# Patient Record
Sex: MALE | Race: WHITE | NOT HISPANIC OR LATINO | URBAN - NONMETROPOLITAN AREA
[De-identification: names, ages, dates, MRNs, and addresses within clinical notes are randomized per-mention and may not be internally consistent; named-entity substitution may affect disease eponyms.]

---

## 2017-10-09 ENCOUNTER — FOLLOW UP ESTABLISHED (OUTPATIENT)
Dept: URBAN - NONMETROPOLITAN AREA CLINIC 6 | Facility: CLINIC | Age: 62
End: 2017-10-09
Payer: COMMERCIAL

## 2017-10-09 DIAGNOSIS — H25.13 AGE-RELATED NUCLEAR CATARACT, BILATERAL: ICD-10-CM

## 2017-10-09 DIAGNOSIS — H52.4 PRESBYOPIA: ICD-10-CM

## 2017-10-09 PROCEDURE — 92014 COMPRE OPH EXAM EST PT 1/>: CPT | Performed by: OPTOMETRIST

## 2017-10-09 PROCEDURE — 92015 DETERMINE REFRACTIVE STATE: CPT | Performed by: OPTOMETRIST

## 2017-10-09 ASSESSMENT — VISUAL ACUITY
OS: 20/20
OD: 20/20

## 2017-10-09 ASSESSMENT — INTRAOCULAR PRESSURE
OD: 14
OS: 15

## 2018-04-16 ENCOUNTER — FOLLOW UP ESTABLISHED (OUTPATIENT)
Dept: URBAN - NONMETROPOLITAN AREA CLINIC 6 | Facility: CLINIC | Age: 63
End: 2018-04-16

## 2018-04-16 PROCEDURE — 92015 DETERMINE REFRACTIVE STATE: CPT | Performed by: OPTOMETRIST

## 2018-04-16 ASSESSMENT — VISUAL ACUITY
OS: 20/20
OD: 20/20

## 2018-04-16 ASSESSMENT — INTRAOCULAR PRESSURE
OD: 16
OS: 16

## 2019-08-12 ENCOUNTER — FOLLOW UP ESTABLISHED (OUTPATIENT)
Dept: URBAN - NONMETROPOLITAN AREA CLINIC 6 | Facility: CLINIC | Age: 64
End: 2019-08-12
Payer: COMMERCIAL

## 2019-08-12 DIAGNOSIS — H52.03 HYPERMETROPIA, BILATERAL: Primary | ICD-10-CM

## 2019-08-12 PROCEDURE — 92014 COMPRE OPH EXAM EST PT 1/>: CPT | Performed by: OPTOMETRIST

## 2019-08-12 PROCEDURE — 92015 DETERMINE REFRACTIVE STATE: CPT | Performed by: OPTOMETRIST

## 2019-08-12 ASSESSMENT — INTRAOCULAR PRESSURE
OS: 17
OD: 16

## 2019-08-12 ASSESSMENT — VISUAL ACUITY: OD: 20/20

## 2021-05-14 ENCOUNTER — OFFICE VISIT (OUTPATIENT)
Dept: URBAN - NONMETROPOLITAN AREA CLINIC 6 | Facility: CLINIC | Age: 66
End: 2021-05-14
Payer: COMMERCIAL

## 2021-05-14 PROCEDURE — 92014 COMPRE OPH EXAM EST PT 1/>: CPT | Performed by: OPTOMETRIST

## 2021-05-14 ASSESSMENT — INTRAOCULAR PRESSURE
OD: 16
OS: 16

## 2021-05-14 ASSESSMENT — VISUAL ACUITY
OD: 20/20
OS: 20/20

## 2022-05-23 ENCOUNTER — OFFICE VISIT (OUTPATIENT)
Dept: URBAN - NONMETROPOLITAN AREA CLINIC 6 | Facility: CLINIC | Age: 67
End: 2022-05-23
Payer: COMMERCIAL

## 2022-05-23 DIAGNOSIS — H25.13 AGE-RELATED NUCLEAR CATARACT, BILATERAL: ICD-10-CM

## 2022-05-23 DIAGNOSIS — H52.4 PRESBYOPIA: Primary | ICD-10-CM

## 2022-05-23 PROCEDURE — 92014 COMPRE OPH EXAM EST PT 1/>: CPT | Performed by: OPTOMETRIST

## 2022-05-23 ASSESSMENT — VISUAL ACUITY
OD: 20/20
OS: 20/20

## 2022-05-23 ASSESSMENT — INTRAOCULAR PRESSURE
OD: 16
OS: 15

## 2023-02-03 ENCOUNTER — OFFICE VISIT (OUTPATIENT)
Dept: URBAN - NONMETROPOLITAN AREA CLINIC 6 | Facility: CLINIC | Age: 68
End: 2023-02-03
Payer: COMMERCIAL

## 2023-02-03 DIAGNOSIS — H43.811 VITREOUS DEGENERATION, RIGHT EYE: ICD-10-CM

## 2023-02-03 DIAGNOSIS — H25.13 AGE-RELATED NUCLEAR CATARACT, BILATERAL: ICD-10-CM

## 2023-02-03 DIAGNOSIS — H52.4 PRESBYOPIA: Primary | ICD-10-CM

## 2023-02-03 PROCEDURE — 92014 COMPRE OPH EXAM EST PT 1/>: CPT | Performed by: OPTOMETRIST

## 2023-02-03 ASSESSMENT — INTRAOCULAR PRESSURE
OD: 16
OS: 15

## 2023-02-03 ASSESSMENT — VISUAL ACUITY
OD: 20/20
OS: 20/20

## 2023-02-03 NOTE — IMPRESSION/PLAN
Impression: Presbyopia: H52.4. Plan: Discussed adjustment period. Glasses Rx dispensed to patient today. Return to clinic if any changes occur.

## 2024-02-02 ENCOUNTER — OFFICE VISIT (OUTPATIENT)
Dept: URBAN - NONMETROPOLITAN AREA CLINIC 6 | Facility: CLINIC | Age: 69
End: 2024-02-02
Payer: MEDICARE

## 2024-02-02 DIAGNOSIS — H52.4 PRESBYOPIA: Primary | ICD-10-CM

## 2024-02-02 DIAGNOSIS — H25.13 AGE-RELATED NUCLEAR CATARACT, BILATERAL: ICD-10-CM

## 2024-02-02 PROCEDURE — 92014 COMPRE OPH EXAM EST PT 1/>: CPT | Performed by: OPTOMETRIST

## 2024-02-02 ASSESSMENT — INTRAOCULAR PRESSURE
OD: 16
OS: 17

## 2024-02-02 ASSESSMENT — VISUAL ACUITY
OS: 20/20
OD: 20/20

## 2025-02-03 ENCOUNTER — OFFICE VISIT (OUTPATIENT)
Dept: URBAN - NONMETROPOLITAN AREA CLINIC 6 | Facility: CLINIC | Age: 70
End: 2025-02-03
Payer: MEDICARE

## 2025-02-03 DIAGNOSIS — H25.13 AGE-RELATED NUCLEAR CATARACT, BILATERAL: Primary | ICD-10-CM

## 2025-02-03 DIAGNOSIS — H52.03 HYPERMETROPIA, BILATERAL: ICD-10-CM

## 2025-02-03 PROCEDURE — 99213 OFFICE O/P EST LOW 20 MIN: CPT | Performed by: OPTOMETRIST

## 2025-02-03 PROCEDURE — 92015 DETERMINE REFRACTIVE STATE: CPT | Performed by: OPTOMETRIST

## 2025-02-03 ASSESSMENT — VISUAL ACUITY
OS: 20/20
OD: 20/20

## 2025-02-03 ASSESSMENT — INTRAOCULAR PRESSURE
OD: 17
OS: 17